# Patient Record
Sex: MALE | Race: WHITE
[De-identification: names, ages, dates, MRNs, and addresses within clinical notes are randomized per-mention and may not be internally consistent; named-entity substitution may affect disease eponyms.]

---

## 2020-02-17 ENCOUNTER — HOSPITAL ENCOUNTER (EMERGENCY)
Dept: HOSPITAL 77 - KA.ED | Age: 45
Discharge: HOME | End: 2020-02-17
Payer: COMMERCIAL

## 2020-02-17 DIAGNOSIS — T23.261A: Primary | ICD-10-CM

## 2020-02-17 DIAGNOSIS — T20.14XA: ICD-10-CM

## 2020-02-17 DIAGNOSIS — T20.112A: ICD-10-CM
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
## 2020-02-17 DIAGNOSIS — T23.212A: ICD-10-CM

## 2020-02-17 RX ADMIN — POLYMYXIN B SULFATE, BACITRACIN ZINC, NEOMYCIN SULFATE ONE GM: 5000; 3.5; 4 OINTMENT TOPICAL at 14:31

## 2020-02-17 RX ADMIN — POLYMYXIN B SULFATE, BACITRACIN ZINC, NEOMYCIN SULFATE ONE: 5000; 3.5; 4 OINTMENT TOPICAL at 15:58
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
